# Patient Record
Sex: MALE | Race: BLACK OR AFRICAN AMERICAN | Employment: FULL TIME | URBAN - METROPOLITAN AREA
[De-identification: names, ages, dates, MRNs, and addresses within clinical notes are randomized per-mention and may not be internally consistent; named-entity substitution may affect disease eponyms.]

---

## 2017-03-08 ENCOUNTER — HOSPITAL ENCOUNTER (EMERGENCY)
Age: 22
Discharge: HOME | End: 2017-03-08
Attending: INTERNAL MEDICINE | Admitting: INTERNAL MEDICINE
Payer: SELF-PAY

## 2017-03-08 VITALS
TEMPERATURE: 98.3 F | DIASTOLIC BLOOD PRESSURE: 66 MMHG | SYSTOLIC BLOOD PRESSURE: 114 MMHG | HEIGHT: 67 IN | BODY MASS INDEX: 20.53 KG/M2 | OXYGEN SATURATION: 100 % | WEIGHT: 130.8 LBS | RESPIRATION RATE: 18 BRPM | HEART RATE: 70 BPM

## 2017-03-08 DIAGNOSIS — N34.2 URETHRITIS: Primary | ICD-10-CM

## 2017-03-08 PROCEDURE — 74011250637 HC RX REV CODE- 250/637: Performed by: INTERNAL MEDICINE

## 2017-03-08 PROCEDURE — 74011000250 HC RX REV CODE- 250: Performed by: INTERNAL MEDICINE

## 2017-03-08 PROCEDURE — 96372 THER/PROPH/DIAG INJ SC/IM: CPT

## 2017-03-08 PROCEDURE — 87491 CHLMYD TRACH DNA AMP PROBE: CPT | Performed by: INTERNAL MEDICINE

## 2017-03-08 PROCEDURE — 99283 EMERGENCY DEPT VISIT LOW MDM: CPT

## 2017-03-08 PROCEDURE — 74011250636 HC RX REV CODE- 250/636: Performed by: INTERNAL MEDICINE

## 2017-03-08 RX ORDER — AZITHROMYCIN 250 MG/1
1000 TABLET, FILM COATED ORAL
Status: COMPLETED | OUTPATIENT
Start: 2017-03-08 | End: 2017-03-08

## 2017-03-08 RX ADMIN — LIDOCAINE HYDROCHLORIDE 250 MG: 10 INJECTION, SOLUTION EPIDURAL; INFILTRATION; INTRACAUDAL; PERINEURAL at 04:52

## 2017-03-08 RX ADMIN — AZITHROMYCIN 1000 MG: 250 TABLET, FILM COATED ORAL at 04:52

## 2017-03-08 NOTE — ED PROVIDER NOTES
HPI Comments: Jovita Brown is a 24 y.o. male with PMHx significant for ADHD who presents ambulatory to the ED c/o dysuria. Pt also states that he wants to be checked for a potential STD. Pt does not state any exacerbating or relieving factors for his dysuria. Pt specifically denies fever, nausea, vomiting, and diarrhea. PCP: None      There are no other complaints, changes or physical findings at this time. This note is prepared by Feroz Dickson, acting as Scribe for Sandi Alonzo MD.    The history is provided by the patient. No  was used. Past Medical History:   Diagnosis Date    Psychiatric disorder     ADHD       No past surgical history on file. No family history on file. Social History     Social History    Marital status: SINGLE     Spouse name: N/A    Number of children: N/A    Years of education: N/A     Occupational History    Not on file. Social History Main Topics    Smoking status: Former Smoker     Packs/day: 0.25    Smokeless tobacco: Not on file    Alcohol use No    Drug use: No    Sexual activity: Yes     Partners: Female     Birth control/ protection: Condom, None     Other Topics Concern    Not on file     Social History Narrative         ALLERGIES: Review of patient's allergies indicates no known allergies. Review of Systems   Constitutional: Negative. Negative for appetite change, chills, fatigue and fever. HENT: Negative. Negative for congestion, rhinorrhea, sinus pressure and sore throat. Eyes: Negative. Respiratory: Negative. Negative for cough, choking, chest tightness, shortness of breath and wheezing. Cardiovascular: Negative. Negative for chest pain, palpitations and leg swelling. Gastrointestinal: Negative for abdominal pain, constipation, diarrhea, nausea and vomiting. Endocrine: Negative. Genitourinary: Positive for dysuria. Negative for difficulty urinating, flank pain and urgency. Musculoskeletal: Negative. Skin: Negative. Neurological: Negative. Negative for dizziness, speech difficulty, weakness, light-headedness, numbness and headaches. Psychiatric/Behavioral: Negative. All other systems reviewed and are negative. Vitals:    03/08/17 0435   BP: 114/66   Pulse: 70   Resp: 18   Temp: 98.3 °F (36.8 °C)   SpO2: 100%   Weight: 59.3 kg (130 lb 12.8 oz)   Height: 5' 7\" (1.702 m)            Physical Exam   Constitutional: He is oriented to person, place, and time. He appears well-developed and well-nourished. No distress. HENT:   Head: Normocephalic and atraumatic. Mouth/Throat: Oropharynx is clear and moist. No oropharyngeal exudate. Eyes: Conjunctivae and EOM are normal. Pupils are equal, round, and reactive to light. Neck: Normal range of motion. Neck supple. No JVD present. No tracheal deviation present. Cardiovascular: Normal rate, regular rhythm, normal heart sounds and intact distal pulses. No murmur heard. Pulmonary/Chest: Effort normal and breath sounds normal. No stridor. No respiratory distress. He has no wheezes. He has no rales. He exhibits no tenderness. Abdominal: Soft. He exhibits no distension. There is no tenderness. There is no rebound and no guarding. Genitourinary:   Genitourinary Comments: Penile exam was normal.   Musculoskeletal: Normal range of motion. He exhibits no edema or tenderness. Neurological: He is alert and oriented to person, place, and time. Skin: Skin is warm and dry. He is not diaphoretic. Psychiatric: He has a normal mood and affect. His behavior is normal.   Nursing note and vitals reviewed.        MDM  Number of Diagnoses or Management Options  Urethritis:   Diagnosis management comments:     DDx: STD concern vs actual urethritis and actual STD       Amount and/or Complexity of Data Reviewed  Review and summarize past medical records: yes    Patient Progress  Patient progress: stable    ED Course Procedures      LABORATORY TESTS:  No results found for this or any previous visit (from the past 12 hour(s)). IMAGING RESULTS:  No orders to display       MEDICATIONS GIVEN:  Medications   cefTRIAXone (ROCEPHIN) 250 mg in lidocaine (PF) (XYLOCAINE) 10 mg/mL (1 %) IM injection (not administered)   azithromycin (ZITHROMAX) tablet 1,000 mg (not administered)       IMPRESSION:  1. Urethritis        PLAN:  1. There are no discharge medications for this patient. 2.   Follow-up Information     Follow up With Details Comments Contact Info    None   None ([phone]) Patient stated that they have no PCP      [address] In 2 days  [place] [address]  [phone]        Return to ED if worse     DISCHARGE NOTE  4:43 AM  The patient has been re-evaluated and is ready for discharge. Reviewed available results with patient. Counseled pt on diagnosis and care plan. Pt has expressed understanding, and all questions have been answered. Pt agrees with plan and agrees to F/U as recommended, or return to the ED if their sxs worsen. Discharge instructions have been provided and explained to the pt, along with reasons to return to the ED. Written by Jeanne Lopez, ED Scribe, as dictated by Amisha Cerna MD.    This note is prepared by Jeanne Lopez, acting as Scribe for Amisha Cerna MD.    Amisha Cerna MD: The scribe's documentation has been prepared under my direction and personally reviewed by me in its entirety. I confirm that the note above accurately reflects all work, treatment, procedures, and medical decision making performed by me.

## 2017-03-08 NOTE — DISCHARGE INSTRUCTIONS
Urethritis: Care Instructions  Your Care Instructions    Urethritis is an infection of the tube that takes urine from the bladder to the outside of the body. This tube is called the urethra. The infection is often caused by bacteria. This can happen if you have a sexually transmitted infection (STI). But a virus may also be a cause. Urethritis is usually treated with antibiotics. Most cases clear up with treatment. Proper treatment is very important. If you don't treat it, the infection can lead to lasting damage of the urethra. Other parts of the urinary system can also be damaged. Follow-up care is a key part of your treatment and safety. Be sure to make and go to all appointments, and call your doctor if you are having problems. It's also a good idea to know your test results and keep a list of the medicines you take. How can you care for yourself at home? · If your doctor prescribed antibiotics, take them as directed. Do not stop taking them just because you feel better. You need to take the full course of antibiotics. · Take an over-the-counter pain medicine, such as acetaminophen (Tylenol), ibuprofen (Advil, Motrin), or naproxen (Aleve), if needed. Be safe with medicines. Read and follow all instructions on the label. · Do not take two or more pain medicines at the same time unless the doctor told you to. Many pain medicines have acetaminophen, which is Tylenol. Too much acetaminophen (Tylenol) can be harmful. · Your doctor may have you take phenazopyridine (Pyridium). This is a pain medicine for the urinary tract. It can turn your urine a deep red-orange. This is normal. Call your doctor if you think you are having a problem with your medicine. · Do not have sex until you are done with treatment. If you do have sex, be sure to use a condom. Your sex partner or partners should be tested too if your urethritis was caused by an STI.   · If your infection was caused by an injury or chemicals, avoid those things if you can. When should you call for help? Call your doctor now or seek immediate medical care if:  · You can't urinate. · You have symptoms of a urinary infection. For example:  ¨ You have blood or pus in your urine. ¨ You have pain in your back just below your rib cage. This is called flank pain. ¨ You have a fever, chills, or body aches. ¨ It hurts to urinate. ¨ You have groin or belly pain. · You have a hard time urinating when your bladder is full. · You notice mental changes or feel confused. Watch closely for changes in your health, and be sure to contact your doctor if:  · You do not get better as expected. Where can you learn more? Go to http://dee-shante.info/. Enter A939 in the search box to learn more about \"Urethritis: Care Instructions. \"  Current as of: August 12, 2016  Content Version: 11.1  © 1338-7002 [employer]. Care instructions adapted under license by [employer] (which disclaims liability or warranty for this information). If you have questions about a medical condition or this instruction, always ask your healthcare professional. [place] [address] any warranty or liability for your use of this information. Urethritis: Care Instructions  Your Care Instructions    Urethritis is an infection of the tube that takes urine from the bladder to the outside of the body. This tube is called the urethra. The infection is often caused by bacteria. This can happen if you have a sexually transmitted infection (STI). But a virus may also be a cause. Urethritis is usually treated with antibiotics. Most cases clear up with treatment. Proper treatment is very important. If you don't treat it, the infection can lead to lasting damage of the urethra. Other parts of the urinary system can also be damaged. Follow-up care is a key part of your treatment and safety.  Be sure to make and go to all appointments, and call your doctor if you are having problems. It's also a good idea to know your test results and keep a list of the medicines you take. How can you care for yourself at home? · If your doctor prescribed antibiotics, take them as directed. Do not stop taking them just because you feel better. You need to take the full course of antibiotics. · Take an over-the-counter pain medicine, such as acetaminophen (Tylenol), ibuprofen (Advil, Motrin), or naproxen (Aleve), if needed. Be safe with medicines. Read and follow all instructions on the label. · Do not take two or more pain medicines at the same time unless the doctor told you to. Many pain medicines have acetaminophen, which is Tylenol. Too much acetaminophen (Tylenol) can be harmful. · Your doctor may have you take phenazopyridine (Pyridium). This is a pain medicine for the urinary tract. It can turn your urine a deep red-orange. This is normal. Call your doctor if you think you are having a problem with your medicine. · Do not have sex until you are done with treatment. If you do have sex, be sure to use a condom. Your sex partner or partners should be tested too if your urethritis was caused by an STI. · If your infection was caused by an injury or chemicals, avoid those things if you can. When should you call for help? Call your doctor now or seek immediate medical care if:  · You can't urinate. · You have symptoms of a urinary infection. For example:  ¨ You have blood or pus in your urine. ¨ You have pain in your back just below your rib cage. This is called flank pain. ¨ You have a fever, chills, or body aches. ¨ It hurts to urinate. ¨ You have groin or belly pain. · You have a hard time urinating when your bladder is full. · You notice mental changes or feel confused. Watch closely for changes in your health, and be sure to contact your doctor if:  · You do not get better as expected. Where can you learn more?   Go to http://dee-shante.info/. Enter I916 in the search box to learn more about \"Urethritis: Care Instructions. \"  Current as of: August 12, 2016  Content Version: 11.1  © 5129-9329 [employer], [facility]. Care instructions adapted under license by [employer] (which disclaims liability or warranty for this information). If you have questions about a medical condition or this instruction, always ask your healthcare professional. [place] [address] any warranty or liability for your use of this information.

## 2017-03-08 NOTE — LETTER
3/13/2017 [address] [id number] Dear Mr. Luna Ivey You were seen in the Emergency Department of [facility] on 3/8/17 and had lab and/or radiology tests performed. The gonorrhea, chlamydia from your Emergency Department visit on 3/8/17 was positive. You were treated appropriately during your visit. No further treatment is required. If you have not improved or are worsening, please follow up with your primary care doctor or Emergency department as soon as possible. Your partner needs to be treated. If you have any questions please contact the Emergency Department at [phone]. Sincerely, Rachel Lopez PA-C 
 
[facility] EMERGENCY DEPT 
[address] [id number] [phone]

## 2017-03-10 LAB
C TRACH DNA SPEC QL NAA+PROBE: POSITIVE
N GONORRHOEA DNA SPEC QL NAA+PROBE: POSITIVE
SAMPLE TYPE: ABNORMAL
SERVICE CMNT-IMP: ABNORMAL
SPECIMEN SOURCE: ABNORMAL

## 2019-04-30 ENCOUNTER — APPOINTMENT (OUTPATIENT)
Dept: GENERAL RADIOLOGY | Age: 24
End: 2019-04-30
Attending: EMERGENCY MEDICINE
Payer: SELF-PAY

## 2019-04-30 ENCOUNTER — HOSPITAL ENCOUNTER (EMERGENCY)
Age: 24
Discharge: HOME | End: 2019-04-30
Attending: EMERGENCY MEDICINE
Payer: SELF-PAY

## 2019-04-30 VITALS
WEIGHT: 122.19 LBS | SYSTOLIC BLOOD PRESSURE: 108 MMHG | RESPIRATION RATE: 18 BRPM | DIASTOLIC BLOOD PRESSURE: 64 MMHG | OXYGEN SATURATION: 99 % | TEMPERATURE: 97.8 F | BODY MASS INDEX: 19.14 KG/M2 | HEART RATE: 75 BPM

## 2019-04-30 DIAGNOSIS — S69.92XA HAND INJURY, LEFT, INITIAL ENCOUNTER: Primary | ICD-10-CM

## 2019-04-30 DIAGNOSIS — S60.222A CONTUSION OF LEFT HAND, INITIAL ENCOUNTER: ICD-10-CM

## 2019-04-30 DIAGNOSIS — Z72.0 TOBACCO ABUSE: ICD-10-CM

## 2019-04-30 DIAGNOSIS — Z71.6 ENCOUNTER FOR SMOKING CESSATION COUNSELING: ICD-10-CM

## 2019-04-30 DIAGNOSIS — M79.642 LEFT HAND PAIN: ICD-10-CM

## 2019-04-30 PROCEDURE — 99283 EMERGENCY DEPT VISIT LOW MDM: CPT

## 2019-04-30 PROCEDURE — L3908 WHO COCK-UP NONMOLDE PRE OTS: HCPCS

## 2019-04-30 PROCEDURE — 74011250637 HC RX REV CODE- 250/637: Performed by: EMERGENCY MEDICINE

## 2019-04-30 PROCEDURE — 73130 X-RAY EXAM OF HAND: CPT

## 2019-04-30 PROCEDURE — 73110 X-RAY EXAM OF WRIST: CPT

## 2019-04-30 RX ORDER — DICLOFENAC SODIUM 50 MG/1
50 TABLET, DELAYED RELEASE ORAL 2 TIMES DAILY
Qty: 20 TAB | Refills: 0 | Status: SHIPPED | OUTPATIENT
Start: 2019-04-30

## 2019-04-30 RX ORDER — DICLOFENAC SODIUM 10 MG/G
GEL TOPICAL 4 TIMES DAILY
Qty: 1 EACH | Refills: 0 | Status: SHIPPED | OUTPATIENT
Start: 2019-04-30

## 2019-04-30 RX ORDER — IBUPROFEN 600 MG/1
600 TABLET ORAL
Status: DISCONTINUED | OUTPATIENT
Start: 2019-04-30 | End: 2019-04-30

## 2019-04-30 RX ORDER — OXYCODONE AND ACETAMINOPHEN 5; 325 MG/1; MG/1
1 TABLET ORAL
Status: COMPLETED | OUTPATIENT
Start: 2019-04-30 | End: 2019-04-30

## 2019-04-30 RX ORDER — DICLOFENAC SODIUM 10 MG/G
GEL TOPICAL 4 TIMES DAILY
Qty: 1 EACH | Refills: 0 | Status: SHIPPED | OUTPATIENT
Start: 2019-04-30 | End: 2019-04-30

## 2019-04-30 RX ORDER — DICLOFENAC SODIUM 50 MG/1
50 TABLET, DELAYED RELEASE ORAL 2 TIMES DAILY
Qty: 20 TAB | Refills: 0 | Status: SHIPPED | OUTPATIENT
Start: 2019-04-30 | End: 2019-04-30

## 2019-04-30 RX ADMIN — OXYCODONE AND ACETAMINOPHEN 1 TABLET: 5; 325 TABLET ORAL at 02:57

## 2019-04-30 NOTE — ED NOTES
Pt presents to ED ambulatory complaining of left hand and forearm pain and right lateral thigh pain x 6 hours. Pt reports he was tackled by police earlier when he sustained these injuries. Pt noted to have swelling to left hand. Pulses and perfusion intact in affected extremities. Pt is alert and oriented x 4, RR even and unlabored. Assessment completed and pt updated on plan of care. Emergency Department Nursing Plan of Care The Nursing Plan of Care is developed from the Nursing assessment and Emergency Department Attending provider initial evaluation. The plan of care may be reviewed in the ED Provider note. The Plan of Care was developed with the following considerations:  
Patient / Family readiness to learn indicated by:verbalized understanding Persons(s) to be included in education: patient Barriers to Learning/Limitations:No 
 
Signed Abran Herrera V   
4/30/2019   2:47 AM

## 2019-04-30 NOTE — ED TRIAGE NOTES
Patient says his left hand and right leg ( thigh area) was injured while being arrested today six hours ago.
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
## 2019-04-30 NOTE — ED NOTES
Discharge instructions were given to the patient by Lord Anthony RN. The patient left the Emergency Department ambulatory, alert and oriented and in no acute distress with 2 prescriptions. The patient was encouraged to call or return to the ED for worsening issues or problems and was encouraged to schedule a follow up appointment for continuing care. The patient verbalized understanding of discharge instructions and prescriptions, all questions were answered. The patient has no further concerns at this time.

## 2019-05-02 ENCOUNTER — HOSPITAL ENCOUNTER (EMERGENCY)
Age: 24
Discharge: HOME | End: 2019-05-02
Attending: EMERGENCY MEDICINE | Admitting: EMERGENCY MEDICINE
Payer: SELF-PAY

## 2019-05-02 VITALS
TEMPERATURE: 98.3 F | HEIGHT: 67 IN | DIASTOLIC BLOOD PRESSURE: 54 MMHG | RESPIRATION RATE: 18 BRPM | HEART RATE: 62 BPM | BODY MASS INDEX: 20.4 KG/M2 | WEIGHT: 130 LBS | SYSTOLIC BLOOD PRESSURE: 100 MMHG | OXYGEN SATURATION: 96 %

## 2019-05-02 DIAGNOSIS — S60.222D CONTUSION OF LEFT HAND, SUBSEQUENT ENCOUNTER: Primary | ICD-10-CM

## 2019-05-02 PROCEDURE — 74011000250 HC RX REV CODE- 250: Performed by: EMERGENCY MEDICINE

## 2019-05-02 PROCEDURE — A4565 SLINGS: HCPCS

## 2019-05-02 PROCEDURE — 99283 EMERGENCY DEPT VISIT LOW MDM: CPT

## 2019-05-02 RX ORDER — LIDOCAINE 4 G/100G
PATCH TOPICAL
Qty: 2 PACKAGE | Refills: 0 | Status: SHIPPED | OUTPATIENT
Start: 2019-05-02

## 2019-05-02 RX ORDER — LIDOCAINE 4 G/100G
1 PATCH TOPICAL EVERY 24 HOURS
Status: DISCONTINUED | OUTPATIENT
Start: 2019-05-02 | End: 2019-05-02 | Stop reason: HOSPADM
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
## 2019-05-02 NOTE — ED NOTES
Patient (s) given copy of dc instructions and 1 written script(s)/ 0 electronic script(s). Patient(s) verbalized understanding of instructions and script (s). Patient given a current medication reconciliation form and verbalized understanding of their medications. Patient (s) verbalized understanding of the importance of discussing medications with  his or her physician or clinic when they follow up. Patient alert and oriented and in no acute distress. Pt verbalizes pain of 9 out of 10 on a numeric scale in left hand for two days. Pt declined wheelchair at discharge. Patient discharged home ambulatory with self.

## 2019-05-02 NOTE — DISCHARGE INSTRUCTIONS
Patient Education        Hand Bruises: Care Instructions  Your Care Instructions  Bruises, or contusions, can happen as a result of an impact or fall. Most people think of a bruise as a black-and-blue spot. This happens when small blood vessels get torn and leak blood under the skin. The bruise may turn purplish black, reddish blue, or yellowish green as it heals. But bones and muscles can also get bruised. This may damage the hand but not cause a bruise that you can see. Most bruises aren't serious and will go away on their own in 2 to 4 weeks. But sometimes a more serious hand injury might not heal on its own. Tell your doctor if you have new symptoms or your injury is not getting better over time. You may have tests to see if you have bone or nerve damage. These tests may include X-rays, a CT scan, or an MRI. If you damaged bones or muscles, you may need more treatment. The doctor has checked you carefully, but problems can develop later. If you notice any problems or new symptoms, get medical treatment right away. Follow-up care is a key part of your treatment and safety. Be sure to make and go to all appointments, and call your doctor if you are having problems. It's also a good idea to know your test results and keep a list of the medicines you take. How can you care for yourself at home? · Put ice or a cold pack on the hand for 10 to 20 minutes at a time. Put a thin cloth between the ice and your skin. · Prop up your hand on a pillow when you ice it or anytime you sit or lie down during the next 3 days. Try to keep your hand above the level of your heart. This will help reduce swelling. · Be safe with medicines. Read and follow all instructions on the label. ? If the doctor gave you a prescription medicine for pain, take it as prescribed. ? If you are not taking a prescription pain medicine, ask your doctor if you can take an over-the-counter medicine.   · Be sure to follow your doctor's advice about moving and exercising your injured hand. When should you call for help? Call your doctor now or seek immediate medical care if:    · Your pain gets worse.     · You have new or worse swelling.     · You have tingling, weakness, or numbness in the area near the bruise.     · The area near the bruise is cold or pale.     · You have symptoms of infection, such as:  ? Increased pain, swelling, warmth, or redness. ? Red streaks leading from the area. ? Pus draining from the area. ? A fever.    Watch closely for changes in your health, and be sure to contact your doctor if:    · You do not get better as expected. Where can you learn more? Go to http://dee-shante.info/. Enter Y060 in the search box to learn more about \"Hand Bruises: Care Instructions. \"  Current as of: September 23, 2018  Content Version: 11.9  © 7933-8121 [employer]. Care instructions adapted under license by [employer] (which disclaims liability or warranty for this information). If you have questions about a medical condition or this instruction, always ask your healthcare professional. [address] any warranty or liability for your use of this information.

## 2020-01-21 ENCOUNTER — HOSPITAL ENCOUNTER (EMERGENCY)
Age: 25
Discharge: HOME | End: 2020-01-21
Attending: EMERGENCY MEDICINE
Payer: SELF-PAY

## 2020-01-21 VITALS
HEIGHT: 67 IN | DIASTOLIC BLOOD PRESSURE: 46 MMHG | RESPIRATION RATE: 16 BRPM | HEART RATE: 62 BPM | OXYGEN SATURATION: 100 % | SYSTOLIC BLOOD PRESSURE: 120 MMHG | BODY MASS INDEX: 20.28 KG/M2 | TEMPERATURE: 98.1 F | WEIGHT: 129.19 LBS

## 2020-01-21 DIAGNOSIS — Z51.89 VISIT FOR WOUND CHECK: Primary | ICD-10-CM

## 2020-01-21 DIAGNOSIS — Z48.02 VISIT FOR SUTURE REMOVAL: ICD-10-CM

## 2020-01-21 PROCEDURE — 75810000275 HC EMERGENCY DEPT VISIT NO LEVEL OF CARE

## 2020-01-21 NOTE — LETTER
[facility] EMERGENCY DEPT 
[address] [facility] [place] [address] 
[phone] Work/School Note Date: 1/21/2020 To Whom It May concern: 
 
Owen Judge was seen and treated today in the emergency room by the following provider(s): 
Attending Provider: Liza Andujar MD 
Physician Assistant: Blessing Miles. Owen Judge may return to work tomorrow Sincerely, 
 
 
 
 
VARGAS Aguiar
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
## 2020-10-23 ENCOUNTER — HOSPITAL ENCOUNTER (EMERGENCY)
Age: 25
Discharge: HOME | End: 2020-10-23
Attending: EMERGENCY MEDICINE | Admitting: EMERGENCY MEDICINE

## 2020-10-23 VITALS
DIASTOLIC BLOOD PRESSURE: 67 MMHG | HEART RATE: 64 BPM | OXYGEN SATURATION: 100 % | SYSTOLIC BLOOD PRESSURE: 127 MMHG | HEIGHT: 67 IN | WEIGHT: 147.71 LBS | TEMPERATURE: 98.3 F | RESPIRATION RATE: 12 BRPM | BODY MASS INDEX: 23.18 KG/M2

## 2020-10-23 DIAGNOSIS — H00.011 HORDEOLUM EXTERNUM OF RIGHT UPPER EYELID: Primary | ICD-10-CM

## 2020-10-23 PROCEDURE — 99283 EMERGENCY DEPT VISIT LOW MDM: CPT

## 2020-10-23 PROCEDURE — 74011250637 HC RX REV CODE- 250/637: Performed by: EMERGENCY MEDICINE

## 2020-10-23 RX ORDER — ONDANSETRON 4 MG/1
4 TABLET, ORALLY DISINTEGRATING ORAL
Status: COMPLETED | OUTPATIENT
Start: 2020-10-23 | End: 2020-10-23

## 2020-10-23 RX ORDER — ONDANSETRON 4 MG/1
4 TABLET, ORALLY DISINTEGRATING ORAL
Qty: 20 TAB | Refills: 0 | Status: SHIPPED | OUTPATIENT
Start: 2020-10-23

## 2020-10-23 RX ADMIN — ONDANSETRON 4 MG: 4 TABLET, ORALLY DISINTEGRATING ORAL at 16:56

## 2020-10-23 NOTE — ED TRIAGE NOTES
Right eye pain, redness of upper eye lid. Also reports vomiting x2 episodes today and some diarrhea as well.

## 2020-10-23 NOTE — ED NOTES
Emergency Department Nursing Plan of Care       The Nursing Plan of Care is developed from the Nursing assessment and Emergency Department Attending provider initial evaluation. The plan of care may be reviewed in the ED Provider note.     The Plan of Care was developed with the following considerations:   Patient / Family readiness to learn indicated by:verbalized understanding  Persons(s) to be included in education: patient  Barriers to Learning/Limitations:No    Signed     Desirae Parsons RN    10/23/2020   4:52 PM

## 2020-10-23 NOTE — ED PROVIDER NOTES
EMERGENCY DEPARTMENT HISTORY AND PHYSICAL EXAM      Date: 10/23/2020  Patient Name: Letty Tolentino    History of Presenting Illness     Chief Complaint   Patient presents with    Eye Pain    Vomiting       History Provided By: Patient    HPI: Letty Tolentino, 25 y.o. male presents to the ED with cc of right eye irritation, nausea, vomiting. Right eye irritation has been present for the past 2 days. He developed redness and swelling around a stye located to the right upper eyelid yesterday. He does endorse some general eye irritation but denies any actual eye pain or visual disturbance. Denies any fevers, chills, recent illness. Denies any exposure to sick contacts with similar symptoms. He does not wear contact lenses. He also endorses nausea, vomiting x2 episodes today after he ate spaghetti and pizza. He denies any diarrhea but does endorse increased frequency of stools. Denies any abdominal pain or fevers. There are no other complaints, changes, or physical findings at this time. PCP: None    No current facility-administered medications on file prior to encounter. Current Outpatient Medications on File Prior to Encounter   Medication Sig Dispense Refill    lidocaine 4 % patch Apply to left hand bid prn pain 2 Package 0    diclofenac EC (VOLTAREN) 50 mg EC tablet Take 1 Tab by mouth two (2) times a day. 20 Tab 0    diclofenac (VOLTAREN) 1 % gel Apply  to affected area four (4) times daily. 1 Each 0       Past History     Past Medical History:  Past Medical History:   Diagnosis Date    Psychiatric disorder     ADHD and ADD       Past Surgical History:  History reviewed. No pertinent surgical history. Family History:  History reviewed. No pertinent family history.     Social History:  Social History     Tobacco Use    Smoking status: Current Every Day Smoker     Packs/day: 0.25    Smokeless tobacco: Never Used   Substance Use Topics    Alcohol use: No    Drug use: Not Currently Allergies:  No Known Allergies      Review of Systems   Review of Systems   Constitutional: Negative for chills and fever. Eyes: Positive for redness and itching. Negative for pain and visual disturbance. Gastrointestinal: Positive for nausea and vomiting. Negative for abdominal pain and diarrhea. Genitourinary: Negative. Neurological: Negative for dizziness, light-headedness and headaches. All other systems reviewed and are negative. Physical Exam   Physical Exam  Vitals signs reviewed. Constitutional:       General: He is not in acute distress. Appearance: Normal appearance. He is not ill-appearing, toxic-appearing or diaphoretic. HENT:      Head: Normocephalic and atraumatic. Eyes:      Extraocular Movements: Extraocular movements intact. Conjunctiva/sclera: Conjunctivae normal.      Pupils: Pupils are equal, round, and reactive to light. Comments: No conjunctival injection. Right upper eyelid with hordeolum with minimal surrounding erythema and swelling. No obvious corneal abrasion noted. Cardiovascular:      Rate and Rhythm: Normal rate and regular rhythm. Heart sounds: Normal heart sounds. No murmur. Pulmonary:      Effort: Pulmonary effort is normal. No respiratory distress. Breath sounds: Normal breath sounds. No wheezing. Abdominal:      Palpations: Abdomen is soft. Tenderness: There is no abdominal tenderness. There is no guarding or rebound. Skin:     General: Skin is warm and dry. Findings: No erythema or rash. Neurological:      General: No focal deficit present. Mental Status: He is alert and oriented to person, place, and time. Diagnostic Study Results     Labs -   No results found for this or any previous visit (from the past 12 hour(s)).     Radiologic Studies -   No orders to display     CT Results  (Last 48 hours)    None        CXR Results  (Last 48 hours)    None          Medical Decision Making   I am the first provider for this patient. I reviewed the vital signs, available nursing notes, past medical history, past surgical history, family history and social history. Vital Signs-Reviewed the patient's vital signs. Patient Vitals for the past 12 hrs:   Temp Pulse Resp BP SpO2   10/23/20 1547 98.3 °F (36.8 °C) 64 12 127/67 100 %       Records Reviewed: Nursing Notes    Provider Notes (Medical Decision Making):   66-year-old male here with right upper eyelid hordeolum. He is not a contact lens wear. There is not appear to be any ocular injury. Vision intact. Will treat symptomatically with artificial tears and warm compresses. Regarding his nausea and vomiting he has no active emesis in the ED and is feeling well. He appears clinically well and nontoxic and abdomen is soft, benign, nontender nonperitoneal.  Will treat with Zofran, he is able to tolerate p.o. intake at this time. Encouraged outpatient follow-up and given strict return ED precautions. ED Course:   Initial assessment performed. The patients presenting problems have been discussed, and they are in agreement with the care plan formulated and outlined with them. I have encouraged them to ask questions as they arise throughout their visit. Discharge Note:  The patient has been re-evaluated and is ready for discharge. Reviewed available results with patient. Counseled patient on diagnosis and care plan. Patient has expressed understanding, and all questions have been answered. Patient agrees with plan and agrees to follow up as recommended, or to return to the ED if their symptoms worsen. Discharge instructions have been provided and explained to the patient, along with reasons to return to the ED. Disposition:  Discharge home    DISCHARGE PLAN:  1.    Current Discharge Medication List      START taking these medications    Details   hydroxypropyl methylcellulose (ISOPTO TEARS) 0.5 % ophthalmic solution Administer 1 Drop to both eyes as needed for Pain. Qty: 15 mL, Refills: 0      ondansetron (ZOFRAN ODT) 4 mg disintegrating tablet Take 1 Tab by mouth every eight (8) hours as needed for Nausea or Vomiting. Qty: 20 Tab, Refills: 0           2. Follow-up Information     Follow up With Specialties Details Why [address] Internal Medicine Schedule an appointment as soon as possible for a visit  to establish with a PCP [facility]  [address] [facility] [address] [place] [address] EMERGENCY DEPT Emergency Medicine Go to  As needed, If symptoms worsen [address] [facility]        3. Return to ED if worse     Diagnosis     Clinical Impression:   1. Hordeolum externum of right upper eyelid        Attestations:    Colleen Gaming MD    Please note that this dictation was completed with [employer], the computer voice recognition software. Quite often unanticipated grammatical, syntax, homophones, and other interpretive errors are inadvertently transcribed by the computer software. Please disregard these errors. Please excuse any errors that have escaped final proofreading. Thank you.

## 2020-10-23 NOTE — LETTER
[facility] EMERGENCY DEPT 
[address] [address] 
[phone] Work/School Note Date: 10/23/2020 To Whom It May concern: 
 
Yves Lopez was seen and treated today in the emergency room by the following provider(s): 
Attending Provider: Rashmi Fontaine MD.   
 
Yves Lopez may return to work on 10/25/2020. Sincerely, Aerial RN

## 2021-04-27 NOTE — ED TRIAGE NOTES
Pt was seen 4/30 for hand injury. 2 XR's showed neg fracture. Pt c/o worsening pain. Pt did not f/u with hand surgeon. Pt wanting to be re-evaluated.  

## 2021-07-17 ENCOUNTER — HOSPITAL ENCOUNTER (EMERGENCY)
Age: 26
Discharge: HOME | End: 2021-07-17
Attending: EMERGENCY MEDICINE

## 2021-07-17 ENCOUNTER — APPOINTMENT (OUTPATIENT)
Dept: ULTRASOUND IMAGING | Age: 26
End: 2021-07-17
Attending: EMERGENCY MEDICINE

## 2021-07-17 VITALS
OXYGEN SATURATION: 96 % | HEIGHT: 67 IN | RESPIRATION RATE: 16 BRPM | HEART RATE: 67 BPM | DIASTOLIC BLOOD PRESSURE: 72 MMHG | BODY MASS INDEX: 24.33 KG/M2 | TEMPERATURE: 98.1 F | SYSTOLIC BLOOD PRESSURE: 136 MMHG | WEIGHT: 155 LBS

## 2021-07-17 DIAGNOSIS — N49.2 ABSCESS OF SCROTAL WALL: Primary | ICD-10-CM

## 2021-07-17 LAB
ANION GAP SERPL CALC-SCNC: 7 MMOL/L (ref 5–15)
BASOPHILS # BLD: 0 K/UL (ref 0–0.1)
BASOPHILS NFR BLD: 0 % (ref 0–1)
BUN SERPL-MCNC: 11 MG/DL (ref 6–20)
BUN/CREAT SERPL: 9 (ref 12–20)
CALCIUM SERPL-MCNC: 9.1 MG/DL (ref 8.5–10.1)
CHLORIDE SERPL-SCNC: 103 MMOL/L (ref 97–108)
CO2 SERPL-SCNC: 30 MMOL/L (ref 21–32)
CREAT SERPL-MCNC: 1.16 MG/DL (ref 0.7–1.3)
DIFFERENTIAL METHOD BLD: ABNORMAL
EOSINOPHIL # BLD: 0.1 K/UL (ref 0–0.4)
EOSINOPHIL NFR BLD: 1 % (ref 0–7)
ERYTHROCYTE [DISTWIDTH] IN BLOOD BY AUTOMATED COUNT: 11.3 % (ref 11.5–14.5)
GLUCOSE SERPL-MCNC: 91 MG/DL (ref 65–100)
HCT VFR BLD AUTO: 44.1 % (ref 36.6–50.3)
HGB BLD-MCNC: 14.5 G/DL (ref 12.1–17)
IMM GRANULOCYTES # BLD AUTO: 0 K/UL (ref 0–0.04)
IMM GRANULOCYTES NFR BLD AUTO: 0 % (ref 0–0.5)
LYMPHOCYTES # BLD: 1 K/UL (ref 0.8–3.5)
LYMPHOCYTES NFR BLD: 16 % (ref 12–49)
MCH RBC QN AUTO: 30.2 PG (ref 26–34)
MCHC RBC AUTO-ENTMCNC: 32.9 G/DL (ref 30–36.5)
MCV RBC AUTO: 91.9 FL (ref 80–99)
MONOCYTES # BLD: 0.6 K/UL (ref 0–1)
MONOCYTES NFR BLD: 9 % (ref 5–13)
NEUTS SEG # BLD: 4.5 K/UL (ref 1.8–8)
NEUTS SEG NFR BLD: 74 % (ref 32–75)
NRBC # BLD: 0 K/UL (ref 0–0.01)
NRBC BLD-RTO: 0 PER 100 WBC
PLATELET # BLD AUTO: 207 K/UL (ref 150–400)
PMV BLD AUTO: 10.6 FL (ref 8.9–12.9)
POTASSIUM SERPL-SCNC: 4.2 MMOL/L (ref 3.5–5.1)
RBC # BLD AUTO: 4.8 M/UL (ref 4.1–5.7)
SODIUM SERPL-SCNC: 140 MMOL/L (ref 136–145)
WBC # BLD AUTO: 6.1 K/UL (ref 4.1–11.1)

## 2021-07-17 PROCEDURE — 36415 COLL VENOUS BLD VENIPUNCTURE: CPT

## 2021-07-17 PROCEDURE — 80048 BASIC METABOLIC PNL TOTAL CA: CPT

## 2021-07-17 PROCEDURE — 75810000464 HC DRAINAGE SCROTAL ABSCESS LVL 2 5072

## 2021-07-17 PROCEDURE — 85025 COMPLETE CBC W/AUTO DIFF WBC: CPT

## 2021-07-17 PROCEDURE — 99283 EMERGENCY DEPT VISIT LOW MDM: CPT

## 2021-07-17 PROCEDURE — 74011250637 HC RX REV CODE- 250/637: Performed by: EMERGENCY MEDICINE

## 2021-07-17 PROCEDURE — 76870 US EXAM SCROTUM: CPT

## 2021-07-17 RX ORDER — SODIUM CHLORIDE 0.9 % (FLUSH) 0.9 %
5-40 SYRINGE (ML) INJECTION EVERY 8 HOURS
Status: DISCONTINUED | OUTPATIENT
Start: 2021-07-17 | End: 2021-07-17 | Stop reason: HOSPADM

## 2021-07-17 RX ORDER — KETOROLAC TROMETHAMINE 30 MG/ML
15 INJECTION, SOLUTION INTRAMUSCULAR; INTRAVENOUS
Status: DISCONTINUED | OUTPATIENT
Start: 2021-07-17 | End: 2021-07-17 | Stop reason: HOSPADM

## 2021-07-17 RX ORDER — DOXYCYCLINE HYCLATE 100 MG
100 TABLET ORAL 2 TIMES DAILY
Qty: 14 TABLET | Refills: 0 | Status: SHIPPED | OUTPATIENT
Start: 2021-07-17 | End: 2021-07-24

## 2021-07-17 RX ORDER — IBUPROFEN 800 MG/1
800 TABLET ORAL
Qty: 20 TABLET | Refills: 0 | Status: SHIPPED | OUTPATIENT
Start: 2021-07-17 | End: 2021-07-24

## 2021-07-17 RX ORDER — DOXYCYCLINE HYCLATE 100 MG
100 TABLET ORAL
Status: COMPLETED | OUTPATIENT
Start: 2021-07-17 | End: 2021-07-17

## 2021-07-17 RX ADMIN — DOXYCYCLINE HYCLATE 100 MG: 100 TABLET, COATED ORAL at 10:25

## 2021-07-17 NOTE — LETTER
[address]  [address] [facility] EMERGENCY DEPT  [address] [facility] [address] [phone]    Work/School Note    Date: 7/17/2021    To Whom It May concern:    Brandie Jimenez was seen and treated today in the emergency room by the following provider(s):  Attending Provider: Bebeto Oakes MD.      Brandie Jimenez may return to work on 7/18/2021.     Sincerely,                    Aerial MARILEE
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
## 2021-07-19 ENCOUNTER — HOSPITAL ENCOUNTER (EMERGENCY)
Age: 26
Discharge: HOME | End: 2021-07-19
Attending: EMERGENCY MEDICINE

## 2021-07-19 VITALS
TEMPERATURE: 98.6 F | HEART RATE: 62 BPM | HEIGHT: 67 IN | BODY MASS INDEX: 24.33 KG/M2 | SYSTOLIC BLOOD PRESSURE: 126 MMHG | OXYGEN SATURATION: 95 % | WEIGHT: 155 LBS | DIASTOLIC BLOOD PRESSURE: 61 MMHG | RESPIRATION RATE: 18 BRPM

## 2021-07-19 DIAGNOSIS — Z51.89 WOUND CHECK, ABSCESS: Primary | ICD-10-CM

## 2021-07-19 PROCEDURE — 99282 EMERGENCY DEPT VISIT SF MDM: CPT

## 2021-07-19 NOTE — DISCHARGE INSTRUCTIONS
It was a pleasure taking care of you in our Emergency Department today. We know that when you come to [address] [place] [address], you are entrusting us with your health, comfort, and safety. Our physicians and nurses honor that trust, and truly appreciate the opportunity to care for you and your loved ones. We also value your feedback. If you receive a survey about your Emergency Department experience today, please fill it out. We care about our patients' feedback, and we listen to what you have to say. Thank you!

## 2021-07-19 NOTE — ED PROVIDER NOTES
EMERGENCY DEPARTMENT HISTORY AND PHYSICAL EXAM      Date: 7/19/2021  Patient Name: Jj Lyons    History of Presenting Illness     Chief Complaint   Patient presents with    Wound Check     History Provided By: Patient    HPI: Jj Lyons, 22 y.o. male with medical history significant for ADHD and tobacco abuse who presents via self to the ED with cc of acute moderate need for wound check X 1 day. Patient endorses he had I&D of right scrotal abscess on 7/17/2021 at [facility] ED. Patient Dors that the wound is packed at the time. Patient endorses he was prescribed antibiotics, but he has not yet filled this prescription. Denies any new or worsening symptoms including fever, chills, nausea, vomiting, worsening pain or drainage. Denies any urinary symptoms or testicular pain/swelling. No other medications or modifying factors. PCP: None    There are no other complaints, changes, or physical findings at this time. No current facility-administered medications on file prior to encounter. Current Outpatient Medications on File Prior to Encounter   Medication Sig Dispense Refill    doxycycline (VIBRA-TABS) 100 mg tablet Take 1 Tablet by mouth two (2) times a day for 7 days. 14 Tablet 0    ibuprofen (MOTRIN) 800 mg tablet Take 1 Tablet by mouth every six (6) hours as needed for Pain for up to 7 days. 20 Tablet 0    hydroxypropyl methylcellulose (ISOPTO TEARS) 0.5 % ophthalmic solution Administer 1 Drop to both eyes as needed for Pain. 15 mL 0    ondansetron (ZOFRAN ODT) 4 mg disintegrating tablet Take 1 Tab by mouth every eight (8) hours as needed for Nausea or Vomiting. 20 Tab 0    lidocaine 4 % patch Apply to left hand bid prn pain 2 Package 0    diclofenac EC (VOLTAREN) 50 mg EC tablet Take 1 Tab by mouth two (2) times a day. 20 Tab 0    diclofenac (VOLTAREN) 1 % gel Apply  to affected area four (4) times daily.  1 Each 0     Past History     Past Medical History:  Past Medical History:   Diagnosis Date    Psychiatric disorder     ADHD and ADD     Past Surgical History:  No past surgical history on file. Family History:  No family history on file. Social History:  Social History     Tobacco Use    Smoking status: Current Every Day Smoker     Packs/day: 0.25    Smokeless tobacco: Never Used   Substance Use Topics    Alcohol use: No    Drug use: Not Currently     Allergies:  No Known Allergies  Review of Systems   Review of Systems   Constitutional: Negative. Negative for activity change, appetite change, chills, diaphoresis, fatigue and fever. HENT: Negative. Eyes: Negative. Negative for pain and visual disturbance. Respiratory: Negative. Negative for apnea, cough, chest tightness and shortness of breath. Cardiovascular: Negative. Negative for chest pain, palpitations and leg swelling. Gastrointestinal: Negative. Negative for abdominal pain, diarrhea, nausea and vomiting. Genitourinary: Negative. Negative for decreased urine volume, difficulty urinating, discharge, dysuria, flank pain, frequency, hematuria, penile pain, penile swelling, scrotal swelling and testicular pain. Musculoskeletal: Negative. Skin: Positive for rash and wound. Negative for color change and pallor. Neurological: Negative for dizziness, light-headedness and headaches. Psychiatric/Behavioral: Negative. Physical Exam   Physical Exam  Vitals and nursing note reviewed. Constitutional:       General: He is not in acute distress. Appearance: Normal appearance. He is well-developed. He is not ill-appearing, toxic-appearing or diaphoretic. HENT:      Head: Normocephalic and atraumatic. Right Ear: Hearing and external ear normal.      Left Ear: Hearing and external ear normal.      Nose: Nose normal.   Eyes:      Conjunctiva/sclera: Conjunctivae normal.      Pupils: Pupils are equal, round, and reactive to light.    Pulmonary:      Effort: Pulmonary effort is normal. No accessory muscle usage or respiratory distress. Musculoskeletal:         General: Normal range of motion. Cervical back: Normal range of motion. Skin:     General: Skin is warm and dry. Coloration: Skin is not pale. Findings: Wound present. Neurological:      Mental Status: He is alert and oriented to person, place, and time. Psychiatric:         Speech: Speech normal.         Behavior: Behavior normal.         Thought Content: Thought content normal.         Judgment: Judgment normal.       Diagnostic Study Results   Labs -   No results found for this or any previous visit (from the past 12 hour(s)). Radiologic Studies -   No orders to display     No results found. Medical Decision Making   I am the first provider for this patient. I reviewed the vital signs, available nursing notes, past medical history, past surgical history, family history and social history. Vital Signs-Reviewed the patient's vital signs. Patient Vitals for the past 24 hrs:   Temp Pulse Resp BP SpO2   07/19/21 0912 98.6 °F (37 °C) 62 18 126/61 95 %     Pulse Oximetry Analysis - 95% on RA (normal)    Records Reviewed: Nursing Notes, Old Medical Records, Previous Radiology Studies and Previous Laboratory Studies    Provider Notes (Medical Decision Making):   25-year-old male presents for wound check secondary to I&D of right scrotal abscess on 7/17/2021. Wound was packed at the time, but there is no packing present on exam.  Wound appears to be healing well with no fluctuance, drainage, warmth, erythema, or other signs of infection. Patient endorses he has not yet filled his antibiotic prescription. Educated patient on importance of completing antibiotics as prescribed and following up with PCP. Strict ED precautions and red flag symptoms discussed. Differential includes abscess, cellulitis, folliculitis. ED Course:   Initial assessment performed.  The patients presenting problems have been discussed, and they are in agreement with the care plan formulated and outlined with them. I have encouraged them to ask questions as they arise throughout their visit. TOBACCO COUNSELING:  Upon evaluation, pt expressed that they are a current tobacco user. Pt has been counseled on the dangers of smoking and was encouraged to quit as soon as possible in order to decrease further risks to their health. Pt has conveyed their understanding of the risks involved should they continue to use tobacco products. 4-minute discussion. Progress Note:   Updated pt on all returned results and findings. Discussed the importance of proper follow up as referred below along with return precautions. Pt in agreement with the care plan and expresses agreement with and understanding of all items discussed. Disposition:  9:24 AM  I have discussed with patient their diagnosis, treatment, and follow up plan. The patient agrees to follow up as outlined in discharge paperwork and also to return to the ED with any worsening. Harry Drummond PA-C      PLAN:  1. Current Discharge Medication List        2. Follow-up Information     Follow up With Specialties Details Why [address] EMERGENCY DEPT Emergency Medicine Go to  As needed, If symptoms worsen [address] [facility]    Lu Oliva MD General Surgery, Breast Surgery, Oncology Schedule an appointment as soon as possible for a visit  As needed [address] [place] [address]  [address] [place] [id number]  [address] [place] [address] Urology  Schedule an appointment as soon as possible for a visit  As needed [address] [facility] [place] [phone]        Return to ED if worse     Diagnosis     Clinical Impression:   1. Wound check, abscess            Please note that this dictation was completed with Dragon, computer voice recognition software.   Quite often unanticipated grammatical, syntax, homophones, and other interpretive errors are inadvertently transcribed by the computer software. Please disregard these errors. Additionally, please excuse any errors that have escaped final proofreading.

## 2021-07-19 NOTE — ED NOTES
Emergency Department Nursing Plan of Care       The Nursing Plan of Care is developed from the Nursing assessment and Emergency Department Attending provider initial evaluation. The plan of care may be reviewed in the ED Provider note.     The Plan of Care was developed with the following considerations:   Patient / Family readiness to learn indicated by:verbalized understanding  Persons(s) to be included in education: patient  Barriers to Learning/Limitations:No    [facility]    7/19/2021   9:30 AM

## 2023-05-19 RX ORDER — ONDANSETRON 4 MG/1
4 TABLET, ORALLY DISINTEGRATING ORAL EVERY 8 HOURS PRN
COMMUNITY
Start: 2020-10-23

## 2023-05-19 RX ORDER — LIDOCAINE 4 G/G
PATCH TOPICAL
COMMUNITY
Start: 2019-05-02

## 2023-07-06 ENCOUNTER — APPOINTMENT (OUTPATIENT)
Facility: HOSPITAL | Age: 28
End: 2023-07-06
Payer: COMMERCIAL

## 2023-07-06 ENCOUNTER — HOSPITAL ENCOUNTER (EMERGENCY)
Facility: HOSPITAL | Age: 28
Discharge: HOME | End: 2023-07-06
Payer: COMMERCIAL

## 2023-07-06 VITALS
DIASTOLIC BLOOD PRESSURE: 73 MMHG | SYSTOLIC BLOOD PRESSURE: 136 MMHG | HEART RATE: 64 BPM | OXYGEN SATURATION: 100 % | TEMPERATURE: 98.5 F | HEIGHT: 67 IN | RESPIRATION RATE: 19 BRPM | WEIGHT: 150 LBS | BODY MASS INDEX: 23.54 KG/M2

## 2023-07-06 DIAGNOSIS — Z72.0 TOBACCO USE: ICD-10-CM

## 2023-07-06 DIAGNOSIS — S61.210A LACERATION OF RIGHT INDEX FINGER WITHOUT FOREIGN BODY WITHOUT DAMAGE TO NAIL, INITIAL ENCOUNTER: Primary | ICD-10-CM

## 2023-07-06 PROCEDURE — 73140 X-RAY EXAM OF FINGER(S): CPT

## 2023-07-06 PROCEDURE — 6370000000 HC RX 637 (ALT 250 FOR IP): Performed by: PHYSICIAN ASSISTANT

## 2023-07-06 PROCEDURE — 99283 EMERGENCY DEPT VISIT LOW MDM: CPT

## 2023-07-06 PROCEDURE — 6360000002 HC RX W HCPCS: Performed by: PHYSICIAN ASSISTANT

## 2023-07-06 PROCEDURE — 2500000003 HC RX 250 WO HCPCS: Performed by: PHYSICIAN ASSISTANT

## 2023-07-06 PROCEDURE — 12041 INTMD RPR N-HF/GENIT 2.5CM/<: CPT

## 2023-07-06 RX ORDER — IBUPROFEN 200 MG
TABLET ORAL
Status: COMPLETED | OUTPATIENT
Start: 2023-07-06 | End: 2023-07-06

## 2023-07-06 RX ORDER — LIDOCAINE HYDROCHLORIDE 20 MG/ML
5 INJECTION, SOLUTION EPIDURAL; INFILTRATION; INTRACAUDAL; PERINEURAL ONCE
Status: COMPLETED | OUTPATIENT
Start: 2023-07-06 | End: 2023-07-06

## 2023-07-06 RX ORDER — BUPIVACAINE HYDROCHLORIDE 5 MG/ML
5 INJECTION, SOLUTION EPIDURAL; INTRACAUDAL
Status: COMPLETED | OUTPATIENT
Start: 2023-07-06 | End: 2023-07-06

## 2023-07-06 RX ADMIN — LIDOCAINE HYDROCHLORIDE 5 ML: 20 INJECTION, SOLUTION EPIDURAL; INFILTRATION; INTRACAUDAL; PERINEURAL at 12:42

## 2023-07-06 RX ADMIN — BACITRACIN ZINC, NEOMYCIN, POLYMYXIN B: 400; 3.5; 5 OINTMENT TOPICAL at 14:37

## 2023-07-06 RX ADMIN — BUPIVACAINE HYDROCHLORIDE 25 MG: 5 INJECTION, SOLUTION EPIDURAL; INTRACAUDAL; PERINEURAL at 12:42

## 2023-07-06 ASSESSMENT — PAIN DESCRIPTION - ORIENTATION: ORIENTATION: RIGHT

## 2023-07-06 ASSESSMENT — LIFESTYLE VARIABLES
HOW MANY STANDARD DRINKS CONTAINING ALCOHOL DO YOU HAVE ON A TYPICAL DAY: 1 OR 2
HOW OFTEN DO YOU HAVE A DRINK CONTAINING ALCOHOL: MONTHLY OR LESS

## 2023-07-06 ASSESSMENT — ENCOUNTER SYMPTOMS
VOMITING: 0
COUGH: 0
DIARRHEA: 0
EYE PAIN: 0
RHINORRHEA: 0
CHEST TIGHTNESS: 0
NAUSEA: 0
SHORTNESS OF BREATH: 0
SORE THROAT: 0
ABDOMINAL PAIN: 0
PHOTOPHOBIA: 0
WHEEZING: 0
BACK PAIN: 0

## 2023-07-06 ASSESSMENT — PAIN - FUNCTIONAL ASSESSMENT: PAIN_FUNCTIONAL_ASSESSMENT: 0-10

## 2023-07-06 ASSESSMENT — PAIN DESCRIPTION - PAIN TYPE: TYPE: ACUTE PAIN

## 2023-07-06 ASSESSMENT — PAIN SCALES - GENERAL: PAINLEVEL_OUTOF10: 8

## 2023-07-06 ASSESSMENT — PAIN DESCRIPTION - LOCATION: LOCATION: FINGER (COMMENT WHICH ONE)

## 2023-07-06 NOTE — ED NOTES
Patient (s)  given copy of dc instructions and 0 script(s). Patient (s)  verbalized understanding of instructions and script (s). Patient given a current medication reconciliation form and verbalized understanding of their medications. Patient (s) verbalized understanding of the importance of discussing medications with  his or her physician or clinic they will be following up with. Patient alert and oriented and in no acute distress. Patient discharged home ambulatory with self.        Amaury Bojorquez RN  07/06/23 2239

## 2023-07-06 NOTE — ED TRIAGE NOTES
Pt reports lifting the window and his hand went through the glass pane. Laceration noted to R second finger. Reports being UTD on tetanus. V shaped laceration on top of 2nd finger at the second knuckle. Bleeding controlled with bandage.

## 2023-07-06 NOTE — ED PROVIDER NOTES
returned results and findings. Discussed the importance of proper follow up as referred below along with return precautions. Pt in agreement with the care plan and expresses agreement with and understanding of all items discussed. FINAL IMPRESSION   No diagnosis found. DISPOSITION/PLAN   [place] Balbuena's  results have been reviewed with him. He has been counseled regarding his diagnosis, treatment, and plan. He verbally conveys understanding and agreement of the signs, symptoms, diagnosis, treatment and prognosis and additionally agrees to follow up as discussed. He also agrees with the care-plan and conveys that all of his questions have been answered. I have also provided discharge instructions for him that include: educational information regarding their diagnosis and treatment, and list of reasons why they would want to return to the ED prior to their follow-up appointment, should his condition change. DISPOSITION        2:06 PM  I have discussed with patient their diagnosis, treatment, and follow up plan. The patient agrees to follow up as outlined in discharge paperwork and also to return to the ED with any worsening. Tamia Melendez PA-C         PATIENT REFERRED TO:  No follow-up provider specified. DISCHARGE MEDICATIONS:     Medication List        ASK your doctor about these medications      diclofenac 50 MG EC tablet  Commonly known as: VOLTAREN     diclofenac sodium 1 % Gel  Commonly known as: VOLTAREN     hypromellose 0.5 % ophthalmic solution  Commonly known as: ISOPTO TEARS     lidocaine 4 % external patch     ondansetron 4 MG disintegrating tablet  Commonly known as: ZOFRAN-ODT                DISCONTINUED MEDICATIONS:  Current Discharge Medication List          I have seen and evaluated the patient. My supervision physician was available for consultation. I am the Primary Clinician of Record.    Tamia Melendez PA-C (electronically signed)    (Please note that parts of this
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
